# Patient Record
Sex: FEMALE | Race: OTHER | HISPANIC OR LATINO | URBAN - METROPOLITAN AREA
[De-identification: names, ages, dates, MRNs, and addresses within clinical notes are randomized per-mention and may not be internally consistent; named-entity substitution may affect disease eponyms.]

---

## 2017-12-23 ENCOUNTER — EMERGENCY (EMERGENCY)
Facility: HOSPITAL | Age: 27
LOS: 1 days | Discharge: HOME | End: 2017-12-23
Attending: EMERGENCY MEDICINE
Payer: COMMERCIAL

## 2017-12-23 VITALS
HEIGHT: 65 IN | WEIGHT: 134.92 LBS | TEMPERATURE: 98 F | OXYGEN SATURATION: 100 % | RESPIRATION RATE: 18 BRPM | DIASTOLIC BLOOD PRESSURE: 73 MMHG | SYSTOLIC BLOOD PRESSURE: 110 MMHG | HEART RATE: 97 BPM

## 2017-12-23 PROCEDURE — 99283 EMERGENCY DEPT VISIT LOW MDM: CPT

## 2017-12-23 PROCEDURE — 96372 THER/PROPH/DIAG INJ SC/IM: CPT

## 2017-12-23 PROCEDURE — 99283 EMERGENCY DEPT VISIT LOW MDM: CPT | Mod: 25

## 2017-12-23 RX ORDER — KETOROLAC TROMETHAMINE 30 MG/ML
60 SYRINGE (ML) INJECTION ONCE
Qty: 0 | Refills: 0 | Status: DISCONTINUED | OUTPATIENT
Start: 2017-12-23 | End: 2017-12-23

## 2017-12-23 RX ORDER — TRAMADOL HYDROCHLORIDE 50 MG/1
1 TABLET ORAL
Qty: 30 | Refills: 0 | OUTPATIENT
Start: 2017-12-23 | End: 2018-01-21

## 2017-12-23 RX ADMIN — Medication 60 MILLIGRAM(S): at 18:22

## 2017-12-23 NOTE — ED ADULT NURSE NOTE - OBJECTIVE STATEMENT
received pt AOx4 in supertrack, c/o toothache x several months, resp even unlabored, in no distress, pt denies fevers, chills, n/v/d. MAEx4, neuro intact. will continue to monitor

## 2017-12-23 NOTE — ED STATDOCS - PROGRESS NOTE DETAILS
pt with dental caries already on antibiotics. will give pain meds and pt already has appointment to see the dentist in a few days

## 2017-12-23 NOTE — ED STATDOCS - OBJECTIVE STATEMENT
26 y/o F pt presents to the ED with c/o toothache which onset a while ago. Pt has been avoiding seeing the dentist for a while. She had an appointment with her dentist and was rx abx and ibuprofen 3 days ago. Pt still has a lot of pain and that's why she's present today. denies fevers, chills. No further complaints at this time.